# Patient Record
Sex: MALE | Race: WHITE | NOT HISPANIC OR LATINO | Employment: FULL TIME | URBAN - METROPOLITAN AREA
[De-identification: names, ages, dates, MRNs, and addresses within clinical notes are randomized per-mention and may not be internally consistent; named-entity substitution may affect disease eponyms.]

---

## 2019-01-22 ENCOUNTER — OFFICE VISIT (OUTPATIENT)
Dept: URGENT CARE | Age: 29
End: 2019-01-22
Payer: COMMERCIAL

## 2019-01-22 VITALS
HEART RATE: 92 BPM | SYSTOLIC BLOOD PRESSURE: 130 MMHG | OXYGEN SATURATION: 97 % | TEMPERATURE: 98.1 F | BODY MASS INDEX: 29.51 KG/M2 | WEIGHT: 188 LBS | HEIGHT: 67 IN | DIASTOLIC BLOOD PRESSURE: 70 MMHG | RESPIRATION RATE: 18 BRPM

## 2019-01-22 DIAGNOSIS — J11.1 INFLUENZA-LIKE ILLNESS: Primary | ICD-10-CM

## 2019-01-22 PROCEDURE — 99213 OFFICE O/P EST LOW 20 MIN: CPT | Performed by: FAMILY MEDICINE

## 2019-01-22 RX ORDER — SERTRALINE HYDROCHLORIDE 25 MG/1
50 TABLET, FILM COATED ORAL DAILY
COMMUNITY

## 2019-01-22 RX ORDER — OSELTAMIVIR PHOSPHATE 75 MG/1
75 CAPSULE ORAL EVERY 12 HOURS SCHEDULED
Qty: 10 CAPSULE | Refills: 0 | Status: SHIPPED | OUTPATIENT
Start: 2019-01-22 | End: 2019-01-27

## 2019-01-22 NOTE — PROGRESS NOTES
[address] [facility]        NAME: Srinivasan Snider is a 29 y o  male  : 1990    MRN: [id number]  DATE: 2019  TIME: 11:13 AM    Assessment and Plan   Influenza-like illness [R69]  1  Influenza-like illness  oseltamivir (TAMIFLU) 75 mg capsule         Patient Instructions     Patient Instructions   Rest, limit activity  Tamiflu every 12 hours for 10 doses  Cold medication as needed (try Flonase nasal spray 1 spray in each nostril once or twice a day)  Tylenol, or ibuprofen (Advil/Motrin), or try Aleve (please take with food as needed  Recheck/follow-up with family physician as needed  Elidia Barone  [phone]    Please go to the hospital emergency department if needed  Follow up with PCP in 3-5 days  Proceed to  ER if symptoms worsen  Chief Complaint     Chief Complaint   Patient presents with    Cold Like Symptoms     chest congestion , body aches  x 2 days, pain to chest ,neck, and mid back pain , denies vomiting and nausea, c/o 1 episode of  diarrhea     Headache         History of Present Illness       Congestion, body aches, neck and upper back pain (no injury) since yesterday (2019); patient is eating - no nausea or vomiting, 2 loose stools yesterday (2019)        Review of Systems   Review of Systems   HENT: Positive for congestion  Respiratory: Positive for cough  Cardiovascular: Negative  Musculoskeletal: Positive for back pain, myalgias and neck pain  Skin: Positive for pallor  Neurological: Negative            Current Medications       Current Outpatient Prescriptions:     sertraline (ZOLOFT) 25 mg tablet, Take 50 mg by mouth daily, Disp: , Rfl:     oseltamivir (TAMIFLU) 75 mg capsule, Take 1 capsule (75 mg total) by mouth every 12 (twelve) hours for 10 doses, Disp: 10 capsule, Rfl: 0    Current Allergies     Allergies as of 2019    (No Known Allergies)            The following portions of the patient's history were reviewed and updated as appropriate: allergies, current medications, past family history, past medical history, past social history, past surgical history and problem list      Past Medical History:   Diagnosis Date    Depression        History reviewed  No pertinent surgical history  No family history on file  Medications have been verified  Objective   /70   Pulse 92   Temp 98 1 °F (36 7 °C) (Temporal)   Resp 18   Ht 5' 7" (1 702 m)   Wt 85 3 kg (188 lb)   SpO2 97%   BMI 29 44 kg/m²        Physical Exam     Physical Exam   Constitutional: He is oriented to person, place, and time  He appears well-developed and well-nourished  Patient appears ill   HENT:   Right Ear: External ear normal    Left Ear: External ear normal    Nasal congestion with purulent mucus; injection, slight erythema of the oropharynx   Neck: Normal range of motion  Neck supple  Cardiovascular: Normal rate, regular rhythm and normal heart sounds  Pulmonary/Chest: Effort normal and breath sounds normal    Abdominal: Soft  He exhibits no distension  There is no tenderness  There is no rebound and no guarding  Musculoskeletal:   Tenderness and tightness of the cervical/upper thoracic paravertebral, and upper trapezius musculature   Neurological: He is alert and oriented to person, place, and time  No nuchal rigidity   Skin: Skin is warm  There is pallor  Fair turgor   Psychiatric: He has a normal mood and affect  His behavior is normal    Nursing note and vitals reviewed

## 2019-01-22 NOTE — PATIENT INSTRUCTIONS
Rest, limit activity  Tamiflu every 12 hours for 10 doses  Cold medication as needed (try Flonase nasal spray 1 spray in each nostril once or twice a day)  Tylenol, or ibuprofen (Advil/Motrin), or try Aleve (please take with food as needed  Recheck/follow-up with family physician as needed  Lelia Granados  [phone]    Please go to the hospital emergency department if needed

## 2020-11-23 ENCOUNTER — OFFICE VISIT (OUTPATIENT)
Dept: URGENT CARE | Age: 30
End: 2020-11-23
Payer: COMMERCIAL

## 2020-11-23 VITALS
TEMPERATURE: 98.6 F | HEART RATE: 58 BPM | BODY MASS INDEX: 28.88 KG/M2 | OXYGEN SATURATION: 100 % | HEIGHT: 67 IN | RESPIRATION RATE: 18 BRPM | WEIGHT: 184 LBS

## 2020-11-23 DIAGNOSIS — Z20.822 COVID-19 RULED OUT: Primary | ICD-10-CM

## 2020-11-23 PROCEDURE — 99213 OFFICE O/P EST LOW 20 MIN: CPT | Performed by: PHYSICIAN ASSISTANT

## 2020-11-23 PROCEDURE — U0003 INFECTIOUS AGENT DETECTION BY NUCLEIC ACID (DNA OR RNA); SEVERE ACUTE RESPIRATORY SYNDROME CORONAVIRUS 2 (SARS-COV-2) (CORONAVIRUS DISEASE [COVID-19]), AMPLIFIED PROBE TECHNIQUE, MAKING USE OF HIGH THROUGHPUT TECHNOLOGIES AS DESCRIBED BY CMS-2020-01-R: HCPCS | Performed by: PHYSICIAN ASSISTANT

## 2020-11-24 LAB — SARS-COV-2 RNA SPEC QL NAA+PROBE: NOT DETECTED

## 2021-04-08 ENCOUNTER — OFFICE VISIT (OUTPATIENT)
Dept: PHYSICAL THERAPY | Facility: REHABILITATION | Age: 31
End: 2021-04-08
Payer: COMMERCIAL

## 2021-04-08 ENCOUNTER — TRANSCRIBE ORDERS (OUTPATIENT)
Dept: PHYSICAL THERAPY | Facility: REHABILITATION | Age: 31
End: 2021-04-08

## 2021-04-08 DIAGNOSIS — M25.561 ACUTE PAIN OF RIGHT KNEE: Primary | ICD-10-CM

## 2021-04-08 DIAGNOSIS — M25.561 PAIN IN RIGHT KNEE: Primary | ICD-10-CM

## 2021-04-08 PROCEDURE — 97110 THERAPEUTIC EXERCISES: CPT | Performed by: PHYSICAL THERAPIST

## 2021-04-08 PROCEDURE — 97161 PT EVAL LOW COMPLEX 20 MIN: CPT | Performed by: PHYSICAL THERAPIST

## 2021-04-08 NOTE — PROGRESS NOTES
PT Evaluation     Today's date: 2021  Patient name: Marylee Stairs  : 1990  MRN: [id number]  Referring provider: No ref  provider found  Dx:   Encounter Diagnosis     ICD-10-CM    1  Acute pain of right knee  M25 561                   Assessment  Assessment details: Patient presents with decreased LE flexibility, decreased LE strength, and decreased function secondary to acute R knee pain  Patient would benefit from skilled PT intervention to address these issues and to maximize function  Thank you for the referral   Impairments: activity intolerance, impaired physical strength, lacks appropriate home exercise program and pain with function  Other impairment: impaired LE flexibility  Understanding of Dx/Px/POC: good   Prognosis: good    Goals  Short Term:  Pt will report decreased levels of pain by at least 2 subjective ratings in 4 weeks  Pt will demonstrate improved LE flexibility by 25% in 4 weeks  Pt will demonstrate improved strength by 1/2 grade MMT in 4 weeks  Long Term:   Pt will be independent in their HEP in 8 weeks  Pt will demonstrate improved FOTO, > predicted level  Pt will resume exercising at prior level of function without knee pain    Plan  Plan details: Patient was educated in [address] [place] [address] and Plan of Care  All questions were answered to pt's satisfaction  Patient would benefit from: skilled physical therapy  Planned therapy interventions: manual therapy, neuromuscular re-education, patient education, flexibility, therapeutic exercise, therapeutic activities, home exercise program and graded exercise  Frequency: 2x week  Duration in weeks: 6  Plan of Care beginning date: 2021  Plan of Care expiration date: 2021  Treatment plan discussed with: patient        Subjective Evaluation    History of Present Illness  Mechanism of injury: Pt is a 32 y o male with a c/o R knee pain for about 3 weeks ago which started 1 week after a long hike    Pt saw MD at [facility] and was told there is no structural damage  Pt unsure of what his diagnosis is (can't recall what the MD said)  Pt is now referred for PT at this time  Pt reports pain/diffiuclty with running, prolonged ambulation (sore after all day activity), squatting/lunging (has not attempted lately), prolonged driving  Pt is anxious to resume lifting weights and running  Pain  At best pain ratin  At worst pain ratin  Location: R knee      Diagnostic Tests  X-ray: normal  Patient Goals  Patient goals for therapy: decreased pain, return to sport/leisure activities, independence with ADLs/IADLs and increased strength          Objective     Observations     Additional Observation Details  No edema or ecchymosis noted  Tenderness     Right Knee   Tenderness in the medial joint line  Neurological Testing     Additional Neurological Details  Pt denies N/T    Active Range of Motion     Right Knee   Normal active range of motion    Passive Range of Motion     Right Knee   Flexion: WFL and with pain  Extension: [facility]    Additional Passive Range of Motion Details  Mild pain at end-range flexion    Mobility   Patellar Mobility:     Right Knee   WFL: medial, lateral, superior and inferior    Strength/Myotome Testing     Right Hip   Planes of Motion   Flexion: 4+ (mild knee pain)  Extension: 4+  Abduction: 4  External rotation: 4    Isolated Muscles   Gluteus maximums: 4+    Right Knee   Flexion: 5  Extension: 4+  Quadriceps contraction: good    Right Ankle/Foot   Dorsiflexion: 5  Plantar flexion: 5    Tests     Right Knee   Negative anterior drawer, anterior Lachman, Apley's compression, Apley's distraction, bounce home, lateral Joanne, medial Joanne, patella-femoral grind, posterior drawer, valgus stress test at 0 degrees, valgus stress test at 30 degrees, varus stress test at 0 degrees and varus stress test at 30 degrees  General Comments:      Knee Comments  Pt denies crepitus, instability, or catching/locking      Decreased flexibility HS, quads noted               Precautions: depression      Manuals 4/8            Possible GISTM To R distal medial quads             R prone quads stretch                                       Neuro Re-Ed             Rockerboard a/p, m/l (WS and balance)             Rebounder ball toss                                                                              Ther Ex             Bike             Bridges w/TB             Anabella Mejia SLR x4             Leg press                          Pt education + HEP instruction TP 8 mins                         Ther Activity             Squats             Lunges             Monster walk w/TB             Lat step downs             Gait Training                                       Modalities

## 2021-04-15 ENCOUNTER — OFFICE VISIT (OUTPATIENT)
Dept: PHYSICAL THERAPY | Facility: REHABILITATION | Age: 31
End: 2021-04-15
Payer: COMMERCIAL

## 2021-04-15 DIAGNOSIS — M25.561 ACUTE PAIN OF RIGHT KNEE: Primary | ICD-10-CM

## 2021-04-15 PROCEDURE — 97110 THERAPEUTIC EXERCISES: CPT | Performed by: PHYSICAL THERAPIST

## 2021-04-15 PROCEDURE — 97112 NEUROMUSCULAR REEDUCATION: CPT | Performed by: PHYSICAL THERAPIST

## 2021-04-15 PROCEDURE — 97140 MANUAL THERAPY 1/> REGIONS: CPT | Performed by: PHYSICAL THERAPIST

## 2021-04-15 NOTE — PROGRESS NOTES
Daily Note     Today's date: 4/15/2021  Patient name: Tammy Alcocer  : 1990  MRN: [id number]  Referring provider: No ref  provider found  Dx:   Encounter Diagnosis     ICD-10-CM    1  Acute pain of right knee  M25 561        Start Time: 1400  Stop Time: 1445  Total time in clinic (min): 45 minutes    Subjective: Patient reports that knee pain has been minimal over the past week  However he has not done much in terms of physical activity  Objective: See treatment diary below      Assessment: Patient tolerated first treatment well with no aggravation of sx reported following today's session  However, patient challenged with proximal hip PREs with compensation observed during standing hip kicks and clamshells  Progress, as tolerated  Plan: Continue per plan of care        Precautions: depression      Manuals 4/8 4/15           Possible GISTM To R distal medial quads  JAB           R prone quads stretch  JAB                                     Neuro Re-Ed             Rockerboard a/p, m/l (WS and balance)  WS 30"x2 ea           Rebounder ball toss 3 way  Green SLS 20x ea                                                                            Ther Ex             Upright Bike  L4 12'           Bridges w/PB  10x5" straight, bent           Clamshells  2x10 5" ea           [place] SLR x4  GTB 20x ea           Leg press  115# 2x15 DL SL                       Pt education + HEP instruction TP 8 mins                         Ther Activity             Squats             Lunges             Monster walk w/TB             Lat step downs             Gait Training                                       Modalities

## 2021-04-16 ENCOUNTER — OFFICE VISIT (OUTPATIENT)
Dept: PHYSICAL THERAPY | Facility: REHABILITATION | Age: 31
End: 2021-04-16
Payer: COMMERCIAL

## 2021-04-16 DIAGNOSIS — M25.561 ACUTE PAIN OF RIGHT KNEE: Primary | ICD-10-CM

## 2021-04-16 PROCEDURE — 97110 THERAPEUTIC EXERCISES: CPT | Performed by: PHYSICAL THERAPIST

## 2021-04-16 PROCEDURE — 97140 MANUAL THERAPY 1/> REGIONS: CPT | Performed by: PHYSICAL THERAPIST

## 2021-04-16 NOTE — PROGRESS NOTES
Daily Note     Today's date: 2021  Patient name: Frederick Bird  : 1990  MRN: [id number]  Referring provider: No ref  provider found  Dx:   Encounter Diagnosis     ICD-10-CM    1  Acute pain of right knee  M25 561         Pt arrived 20 min late, but was accommodated  Subjective: Pt denies any pain after last session  Objective: See treatment diary below      Assessment: Tolerated treatment well  No pain reported with TE performance  Patient would benefit from continued PT      Plan: Continue per plan of care  Progress treatment as tolerated         Precautions: depression      Manuals 4/8 4/15 4/16          Possible GISTM To R distal medial quads  JAB TP 5'          R prone quads stretch  JAB TP 3' w/half roll                                    Neuro Re-Ed             Rockerboard a/p, m/l (WS and balance)  WS 30"x2 ea           Rebounder ball toss 3 way  Green SLS 20x ea gmb x20ea 3 way                                                                           Ther Ex             Upright Bike  L4 12' nv          Bridges w/PB  10x5" straight, bent BTB 3" 2x10          Clamshells  2x10 5" ea btb 3" 2x10          [place] SLR x4  GTB 20x ea 5# x15ea b/l          Leg press  115# 2x15 DL # 2x15                       Pt education + HEP instruction TP 8 mins                         Ther Activity             Squats             Lunges   2x15ea          Monster walk w/TB             Lat step downs   6" 2x10          Gait Training                                       Modalities

## 2021-04-22 ENCOUNTER — OFFICE VISIT (OUTPATIENT)
Dept: PHYSICAL THERAPY | Facility: REHABILITATION | Age: 31
End: 2021-04-22
Payer: COMMERCIAL

## 2021-04-22 DIAGNOSIS — M25.561 ACUTE PAIN OF RIGHT KNEE: Primary | ICD-10-CM

## 2021-04-22 PROCEDURE — 97530 THERAPEUTIC ACTIVITIES: CPT | Performed by: PHYSICAL THERAPIST

## 2021-04-22 PROCEDURE — 97140 MANUAL THERAPY 1/> REGIONS: CPT | Performed by: PHYSICAL THERAPIST

## 2021-04-22 PROCEDURE — 97110 THERAPEUTIC EXERCISES: CPT | Performed by: PHYSICAL THERAPIST

## 2021-04-22 NOTE — PROGRESS NOTES
Daily Note     Today's date: 2021  Patient name: Matthew Chavez  : 1990  MRN: [id number]  Referring provider: No ref  provider found  Dx:   Encounter Diagnosis     ICD-10-CM    1  Acute pain of right knee  M25 561         1on1 [id number] and performed remaining TE's as part of IEP  Subjective: Pt reports some knee pain, rated 6/10, that started on Tuesday while at work  Pt notes doing a light leg workout at the gym on Monday without pain  4/10 pain/discomfort currently  Objective: See treatment diary below      Assessment: Tolerated treatment well  Mod myofacial restrictions distal medial quads region with GISTM this session  Pt notes intermittent mild pain/discomfort medial aspect of knee with TE performance  No increase in pain reported post session  Patient would benefit from continued PT      Plan: Continue per plan of care        Precautions: depression      Manuals 4/8 4/15 4/16 4/22         Possible GISTM To R distal medial quads  JAB TP 5' TP 5'         R prone quads stretch  JAB TP 3' w/half roll TP 3'w/half roll                                   Neuro Re-Ed             Rockerboard a/p, m/l (WS and balance)  WS 30"x2 ea  30x/30"ea         Rebounder ball toss 3 way  Green SLS 20x ea gmb x20ea 3 way gmb x20ea 3 way                                                                          Ther Ex             Upright Bike  L4 12' nv L4x10'         Bridges w/PB  10x5" straight, bent BTB 3" 2x10 btb 3" 2x15         Clamshells  2x10 5" ea btb 3" 2x10 btb 3" 2x10         [place] SLR x4  GTB 20x ea 5# x15ea b/l 5#x15ea b/l         Leg press  115# 2x15 DL # 2x15 # 2x15                      Pt education + HEP instruction TP 8 mins                         Ther Activity             Squats    2x10         Lunges   2x15ea 2x15ea         Monster walk w/TB    gtb 3 laps         Lat step downs   6" 2x10 6" 2x10         Gait Training                                       Modalities

## 2021-04-23 ENCOUNTER — OFFICE VISIT (OUTPATIENT)
Dept: PHYSICAL THERAPY | Facility: REHABILITATION | Age: 31
End: 2021-04-23
Payer: COMMERCIAL

## 2021-04-23 DIAGNOSIS — M25.561 ACUTE PAIN OF RIGHT KNEE: Primary | ICD-10-CM

## 2021-04-23 PROCEDURE — 97110 THERAPEUTIC EXERCISES: CPT | Performed by: PHYSICAL THERAPIST

## 2021-04-23 PROCEDURE — 97530 THERAPEUTIC ACTIVITIES: CPT | Performed by: PHYSICAL THERAPIST

## 2021-04-23 PROCEDURE — 97140 MANUAL THERAPY 1/> REGIONS: CPT | Performed by: PHYSICAL THERAPIST

## 2021-04-23 NOTE — PROGRESS NOTES
Daily Note     Today's date: 2021  Patient name: Geovanny Clay  : 1990  MRN: [id number]  Referring provider: No ref  provider found  Dx:   Encounter Diagnosis     ICD-10-CM    1  Acute pain of right knee  M25 561             1on1 [id number] and performed remaining TE's as part of IEP  Subjective: Pt reports improved pain today  Objective: See treatment diary below      Assessment: Tolerated treatment well  No significant pain complaints throughout session  Pt challenged with balance/proprioceptive activities, especially balancing m/l direction on rockerboard  Patient would benefit from continued PT      Plan: Continue per plan of care  Progress treatment as tolerated         Precautions: depression      Manuals 4/8 4/15 4/16 4/22 4/23        Possible GISTM To R distal medial quads  JAB TP 5' TP 5' TP 5'        R prone quads stretch  JAB TP 3' w/half roll TP 3'w/half roll TP 3' w/half roll                                  Neuro Re-Ed             Rockerboard a/p, m/l (WS and balance)  WS 30"x2 ea  30x/30"ea 30x/1'ea        Rebounder ball toss 3 way  Green SLS 20x ea gmb x20ea 3 way gmb x20ea 3 way gmb x20ea 3 way                                                                         Ther Ex             Upright Bike  L4 12' nv L4x10' L4x10'        Bridges w/PB  10x5" straight, bent BTB 3" 2x10 btb 3" 2x15 btb 3" 2x15        Clamshells  2x10 5" ea btb 3" 2x10 btb 3" 2x10 btb 3" 2x15        [place] SLR x4  GTB 20x ea 5# x15ea b/l 5#x15ea b/l 5#x20ea b/l        Leg press  115# 2x15 DL # 2x15 # 2x15 # 2x15                     Pt education + HEP instruction TP 8 mins                         Ther Activity             Squats    2x10 2x10        Lunges   2x15ea 2x15ea 2x15ea        Monster walk w/TB    gtb 3 laps gtb 3 laps        Lat step downs   6" 2x10 6" 2x10 6" 2x10        Gait Training                                       Modalities

## 2021-04-29 ENCOUNTER — OFFICE VISIT (OUTPATIENT)
Dept: PHYSICAL THERAPY | Facility: REHABILITATION | Age: 31
End: 2021-04-29
Payer: COMMERCIAL

## 2021-04-29 DIAGNOSIS — M25.561 ACUTE PAIN OF RIGHT KNEE: Primary | ICD-10-CM

## 2021-04-29 PROCEDURE — 97530 THERAPEUTIC ACTIVITIES: CPT | Performed by: PHYSICAL THERAPIST

## 2021-04-29 PROCEDURE — 97110 THERAPEUTIC EXERCISES: CPT | Performed by: PHYSICAL THERAPIST

## 2021-04-29 PROCEDURE — 97140 MANUAL THERAPY 1/> REGIONS: CPT | Performed by: PHYSICAL THERAPIST

## 2021-04-29 NOTE — PROGRESS NOTES
Daily Note     Today's date: 2021  Patient name: Tammy Alcocer  : 1990  MRN: [id number]  Referring provider: No ref  provider found  Dx:   Encounter Diagnosis     ICD-10-CM    1  Acute pain of right knee  M25 561                   Subjective: Pt reports overall the knee has been feeling well without pain  Pt noted mild pain while at work yesterday, but nothing like it was  Objective: See treatment diary below      Assessment: Tolerated treatment well  Good tolerance to progressions this session with no significant pain  Patient exhibited good technique with therapeutic exercises      Plan: Continue per plan of care  Progress treatment as tolerated         Precautions: depression      Manuals 4/8 4/15 4/16 4/22 4/23 4/29       Possible GISTM To R distal medial quads  JAB TP 5' TP 5' TP 5' TP 5'       R prone quads stretch  JAB TP 3' w/half roll TP 3'w/half roll TP 3' w/half roll TP 3' w/half roll                                 Neuro Re-Ed             Rockerboard a/p, m/l (WS and balance)  WS 30"x2 ea  30x/30"ea 30x/1'ea 30x/1'ea       Rebounder ball toss 3 way  Green SLS 20x ea gmb x20ea 3 way gmb x20ea 3 way gmb x20ea 3 way rmb x20ea 3 way                                                                        Ther Ex             Upright Bike  L4 12' nv L4x10' L4x10' St 8 L4x10'       Bridges w/PB  10x5" straight, bent BTB 3" 2x10 btb 3" 2x15 btb 3" 2x15 3" 2x15 plum TB       Clamshells  2x10 5" ea btb 3" 2x10 btb 3" 2x10 btb 3" 2x15 3" 2x15 plum TB       [place] SLR x4  GTB 20x ea 5# x15ea b/l 5#x15ea b/l 5#x20ea b/l 8#x20 e ab/l       Leg press  115# 2x15 DL # 2x15 # 2x15 # 2x15 # 2x15                    Pt education + HEP instruction TP 8 mins                         Ther Activity             Squats    2x10 2x10 15#kb 2x10       Lunges   2x15ea 2x15ea 2x15ea 2x10ea BOSU       Monster walk w/TB    gtb 3 laps gtb 3 laps gtb 3 laps       Lat step downs   6" 2x10 6" 2x10 6" 2x10 6" 2x10       Gait Training                                       Modalities

## 2021-04-30 ENCOUNTER — OFFICE VISIT (OUTPATIENT)
Dept: PHYSICAL THERAPY | Facility: REHABILITATION | Age: 31
End: 2021-04-30
Payer: COMMERCIAL

## 2021-04-30 DIAGNOSIS — M25.561 ACUTE PAIN OF RIGHT KNEE: Primary | ICD-10-CM

## 2021-04-30 PROCEDURE — 97530 THERAPEUTIC ACTIVITIES: CPT | Performed by: PHYSICAL THERAPIST

## 2021-04-30 PROCEDURE — 97110 THERAPEUTIC EXERCISES: CPT | Performed by: PHYSICAL THERAPIST

## 2021-04-30 PROCEDURE — 97140 MANUAL THERAPY 1/> REGIONS: CPT | Performed by: PHYSICAL THERAPIST

## 2021-04-30 NOTE — PROGRESS NOTES
Daily Note     Today's date: 2021  Patient name: Sergio Draper  : 1990  MRN: [id number]  Referring provider: No ref  provider found  Dx:   Encounter Diagnosis     ICD-10-CM    1  Acute pain of right knee  M25 561            1on1 [id number] and performed remainder of TE's as part of IEP  Subjective: Pt offers no complaints of pain  Objective: See treatment diary below      Assessment: Tolerated treatment well  Patient exhibited good technique with therapeutic exercises  Pt was pain-free with exercises this session  Plan: Continue per plan of care  Pt to attempt some running on TM this weekend    Probable d/c to ongoing HEP next week as long as symptoms remain minimal        Precautions: depression      Manuals 4/8 4/15 4/16 4/22 4/23 4/29 4/30      Possible GISTM To R distal medial quads  JAB TP 5' TP 5' TP 5' TP 5' TP 5'      R prone quads stretch  JAB TP 3' w/half roll TP 3'w/half roll TP 3' w/half roll TP 3' w/half roll TP 3' w/half roll                                Neuro Re-Ed             Rockerboard a/p, m/l (WS and balance)  WS 30"x2 ea  30x/30"ea 30x/1'ea 30x/1'ea 30x/1'ea      Rebounder ball toss 3 way  Green SLS 20x ea gmb x20ea 3 way gmb x20ea 3 way gmb x20ea 3 way rmb x20ea 3 way rmb x20ea 3 way                                                                       Ther Ex             Upright Bike  L4 12' nv L4x10' L4x10' St 8 L4x10' ST 8 L4x10'      Bridges w/PB  10x5" straight, bent BTB 3" 2x10 btb 3" 2x15 btb 3" 2x15 3" 2x15 plum TB 3" 2x15 plum TB      Clamshells  2x10 5" ea btb 3" 2x10 btb 3" 2x10 btb 3" 2x15 3" 2x15 plum TB 3" 2x15 plum TB      [place] SLR x4  GTB 20x ea 5# x15ea b/l 5#x15ea b/l 5#x20ea b/l 8#x20 e ab/l 8#x20 ea b/l      Leg press  115# 2x15 DL # 2x15 # 2x15 # 2x15 # 2x15 # 2x15                   Pt education + HEP instruction TP 8 mins                         Ther Activity             Squats    2x10 2x10 15#kb 2x10 15#kb 2x15      Lunges   2x15ea 2x15ea 2x15ea 2x10ea BOSU BOSU 2x15ea      Monster walk w/TB    gtb 3 laps gtb 3 laps gtb 3 laps gtb 3 laps      Lat step downs   6" 2x10 6" 2x10 6" 2x10 6" 2x10 6" 2x15      Gait Training                                       Modalities

## 2021-05-05 ENCOUNTER — TRANSCRIBE ORDERS (OUTPATIENT)
Dept: PHYSICAL THERAPY | Facility: REHABILITATION | Age: 31
End: 2021-05-05

## 2021-05-05 DIAGNOSIS — M25.561 PAIN IN RIGHT KNEE: Primary | ICD-10-CM

## 2021-05-21 NOTE — PROGRESS NOTES
Pt was progressing well with plans to be d/c, but did not attend his final appointment  Pt will be d/c at this time

## 2022-08-25 ENCOUNTER — TELEPHONE (OUTPATIENT)
Dept: OTHER | Facility: OTHER | Age: 32
End: 2022-08-25

## 2022-08-25 NOTE — TELEPHONE ENCOUNTER
Patient calling in stating that he would like to make an appt and establish care at [facility]   Appt made for patient 9/9/22 at 9:00am

## 2022-09-01 NOTE — LETTER
May 5, 2021    Lenin Bond PA-C  [address]  [address] [place] [address]    Patient: Geovanny Clay   YOB: 1990   Date of Visit: 2021     Encounter Diagnosis     ICD-10-CM    1  Acute pain of right knee  M25 561        Dear Dr Geovanny Clay: Thank you for your recent referral of Geovanny Clay  Please review the attached evaluation summary from Navdeep's recent visit  Please verify that you agree with the plan of care by signing the attached order  If you have any questions or concerns, please do not hesitate to call  I sincerely appreciate the opportunity to share in the care of one of your patients and hope to have another opportunity to work with you in the near future  Sincerely,    Red Kussmaul, PT      Referring Provider:      I certify that I have read the below Plan of Care and certify the need for these services furnished under this plan of treatment while under my care  Lenin Bond PA-C  [address]  [address] [place] [address]  Via Fax: [phone]          PT Evaluation     Today's date: 2021  Patient name: Geovanny Clay  : 1990  MRN: [id number]  Referring provider: No ref  provider found  Dx:   Encounter Diagnosis     ICD-10-CM    1  Acute pain of right knee  M25 561                   Assessment  Assessment details: Patient presents with decreased LE flexibility, decreased LE strength, and decreased function secondary to acute R knee pain  Patient would benefit from skilled PT intervention to address these issues and to maximize function    Thank you for the referral   Impairments: activity intolerance, impaired physical strength, lacks appropriate home exercise program and pain with function  Other impairment: impaired LE flexibility  Understanding of Dx/Px/POC: good   Prognosis: good    Goals  Short Term:  Pt will report decreased levels of pain by at least 2 subjective ratings in 4 weeks  Pt will demonstrate Recommended OBSERVATION and MONITORING for change. improved LE flexibility by 25% in 4 weeks  Pt will demonstrate improved strength by 1/2 grade MMT in 4 weeks  Long Term:   Pt will be independent in their HEP in 8 weeks  Pt will demonstrate improved FOTO, > predicted level  Pt will resume exercising at prior level of function without knee pain    Plan  Plan details: Patient was educated in [address] [place] [address] and Plan of Care  All questions were answered to pt's satisfaction  Patient would benefit from: skilled physical therapy  Planned therapy interventions: manual therapy, neuromuscular re-education, patient education, flexibility, therapeutic exercise, therapeutic activities, home exercise program and graded exercise  Frequency: 2x week  Duration in weeks: 6  Plan of Care beginning date: 2021  Plan of Care expiration date: 2021  Treatment plan discussed with: patient        Subjective Evaluation    History of Present Illness  Mechanism of injury: Pt is a 32 y o male with a c/o R knee pain for about 3 weeks ago which started 1 week after a long hike  Pt [facility] and was told there is no structural damage  Pt unsure of what his diagnosis is (can't recall what the MD said)  Pt is now referred for PT at this time  Pt reports pain/diffiuclty with running, prolonged ambulation (sore after all day activity), squatting/lunging (has not attempted lately), prolonged driving  Pt is anxious to resume lifting weights and running  Pain  At best pain ratin  At worst pain ratin  Location: R knee      Diagnostic Tests  X-ray: normal  Patient Goals  Patient goals for therapy: decreased pain, return to sport/leisure activities, independence with ADLs/IADLs and increased strength          Objective     Observations     Additional Observation Details  No edema or ecchymosis noted  Tenderness     Right Knee   Tenderness in the medial joint line       Neurological Testing     Additional Neurological Details  Pt denies N/T    Active Range of Motion     Right Knee Normal active range of motion    Passive Range of Motion     Right Knee   Flexion: WFL and with pain  Extension: [facility]    Additional Passive Range of Motion Details  Mild pain at end-range flexion    Mobility   Patellar Mobility:     Right Knee   WFL: medial, lateral, superior and inferior    Strength/Myotome Testing     Right Hip   Planes of Motion   Flexion: 4+ (mild knee pain)  Extension: 4+  Abduction: 4  External rotation: 4    Isolated Muscles   Gluteus maximums: 4+    Right Knee   Flexion: 5  Extension: 4+  Quadriceps contraction: good    Right Ankle/Foot   Dorsiflexion: 5  Plantar flexion: 5    Tests     Right Knee   Negative anterior drawer, anterior Lachman, Apley's compression, Apley's distraction, bounce home, lateral Joanne, medial Joanne, patella-femoral grind, posterior drawer, valgus stress test at 0 degrees, valgus stress test at 30 degrees, varus stress test at 0 degrees and varus stress test at 30 degrees  General Comments:      Knee Comments  Pt denies crepitus, instability, or catching/locking  Decreased flexibility HS, quads noted               Precautions: depression      Manuals 4/8            Possible GISTM To R distal medial quads             R prone quads stretch                                       Neuro Re-Ed             Rockerboard a/p, m/l (WS and balance)             Rebounder ball toss                                                                              Ther Ex             Bike             Bridges w/TB             Anabella Mejia SLR x4             Leg press                          Pt education + HEP instruction TP 8 mins                         Ther Activity             Squats             Lunges             Monster walk w/TB             Lat step downs             Gait Training                                       Modalities

## 2022-09-09 ENCOUNTER — OFFICE VISIT (OUTPATIENT)
Dept: INTERNAL MEDICINE CLINIC | Facility: CLINIC | Age: 32
End: 2022-09-09
Payer: COMMERCIAL

## 2022-09-09 VITALS
DIASTOLIC BLOOD PRESSURE: 80 MMHG | BODY MASS INDEX: 31.39 KG/M2 | TEMPERATURE: 98 F | OXYGEN SATURATION: 97 % | HEIGHT: 67 IN | HEART RATE: 62 BPM | WEIGHT: 200 LBS | SYSTOLIC BLOOD PRESSURE: 120 MMHG

## 2022-09-09 DIAGNOSIS — Z11.59 ENCOUNTER FOR HEPATITIS C SCREENING TEST FOR LOW RISK PATIENT: ICD-10-CM

## 2022-09-09 DIAGNOSIS — Z13.1 SCREENING FOR DIABETES MELLITUS: Primary | ICD-10-CM

## 2022-09-09 DIAGNOSIS — Z13.220 SCREENING FOR HYPERLIPIDEMIA: ICD-10-CM

## 2022-09-09 DIAGNOSIS — Z11.4 SCREENING FOR HIV WITHOUT PRESENCE OF RISK FACTORS: ICD-10-CM

## 2022-09-09 DIAGNOSIS — Z23 ENCOUNTER FOR IMMUNIZATION: Primary | ICD-10-CM

## 2022-09-09 DIAGNOSIS — Z13.0 SCREENING FOR DEFICIENCY ANEMIA: ICD-10-CM

## 2022-09-09 DIAGNOSIS — Z13.1 SCREENING FOR DIABETES MELLITUS: ICD-10-CM

## 2022-09-09 DIAGNOSIS — Z13.0 SCREENING FOR DEFICIENCY ANEMIA: Primary | ICD-10-CM

## 2022-09-09 PROCEDURE — 99203 OFFICE O/P NEW LOW 30 MIN: CPT | Performed by: INTERNAL MEDICINE

## 2022-09-09 PROCEDURE — 90471 IMMUNIZATION ADMIN: CPT | Performed by: INTERNAL MEDICINE

## 2022-09-09 PROCEDURE — 90715 TDAP VACCINE 7 YRS/> IM: CPT | Performed by: INTERNAL MEDICINE

## 2022-09-09 NOTE — PROGRESS NOTES
Assessment and Plan:     1  Screening for diabetes mellitus  -     Comprehensive metabolic panel  -     Urinalysis with microscopic  -     Hemoglobin A1C    2  Screening for hyperlipidemia  -     Lipid panel; Future    3  Screening for deficiency anemia  -     CBC and differential    4  Screening for HIV without presence of risk factors  -     HIV 1/2 Antigen/Antibody (4th Generation) w Reflex [facility]; Future    5  Encounter for hepatitis C screening test for low risk patient  -     Hepatitis C antibody; Future         Subjective:     Patient ID: Ibis Mai is a 28 y o  male  [facility] is a new patient coming to establish care  He is also new patient for the network  He admits being healthy  His last doctor's visit was about 3-4 years ago  Has depression in his history that tried to treat with Zoloft for over a year but due to little improvement decided to stop it  Currently admits not being depressed and reports enjoying doing his hobbies  He is single and is working at a [employer] as a  and admits a moderate stressful job  He is a former smoker, quit smoking about a year a go  Drinks alcohol socially and denies any illicit drugs  [facility] reports attending gym 3-4 times a week although admits a not balanced diet  He was recommended to incorporated more fruits and veggies and decrease the processed products  [facility] would mention that at times he would have trouble falling asleep and that melatonin would help  We talked about sleep hygiene and was recommended to continue taking melatonin as needed  [facility] also asked if he can get the Tdap vaccine today since his sister is pregnant  He received the vaccine and was provided with the information about the reaction post vaccine  [facility] was provided with lab work to be done at any time and he will be seen at the next visit in a year  Patient has no other complaints at this time      Review of Systems   Constitutional: Negative for chills and fever    HENT: Negative for ear pain and sore throat  Eyes: Negative for pain and visual disturbance  Respiratory: Negative for cough and shortness of breath  Cardiovascular: Negative for chest pain and palpitations  Gastrointestinal: Negative for abdominal pain, diarrhea, nausea and vomiting  Endocrine: Negative for polydipsia and polyphagia  Genitourinary: Negative for difficulty urinating, dysuria and hematuria  Musculoskeletal: Negative for arthralgias and back pain  Skin: Negative for color change and rash  Neurological: Negative for seizures and syncope  Psychiatric/Behavioral: Positive for sleep disturbance  Negative for dysphoric mood  All other systems reviewed and are negative  Patient Active Problem List   Diagnosis    Screening for deficiency anemia    Screening for hyperlipidemia    Screening for HIV without presence of risk factors    Encounter for hepatitis C screening test for low risk patient    Screening for diabetes mellitus        No current outpatient medications on file  No current facility-administered medications for this visit  No Known Allergies     The following portions of the patient's history were reviewed and updated as appropriate: allergies, current medications, past family history, past medical history, past social history, past surgical history and problem list           Objective:    /80   Pulse 62   Temp 98 °F (36 7 °C)   Ht 5' 7" (1 702 m)   Wt 90 7 kg (200 lb)   SpO2 97%   BMI 31 32 kg/m²      Body mass index is 31 32 kg/m²  Physical Exam  Vitals and nursing note reviewed  Constitutional:       Appearance: Normal appearance  He is well-developed  He is not ill-appearing  HENT:      Head: Normocephalic and atraumatic  Nose: Nose normal    Eyes:      Conjunctiva/sclera: Conjunctivae normal    Cardiovascular:      Rate and Rhythm: Normal rate and regular rhythm  Pulses: Normal pulses        Heart sounds: Normal heart sounds  No murmur heard  Pulmonary:      Effort: Pulmonary effort is normal  No respiratory distress  Breath sounds: Normal breath sounds  Abdominal:      General: Bowel sounds are normal       Palpations: Abdomen is soft  Tenderness: There is no abdominal tenderness  Musculoskeletal:      Cervical back: Neck supple  Right lower leg: No edema  Left lower leg: No edema  Skin:     General: Skin is warm and dry  Neurological:      General: No focal deficit present  Mental Status: He is alert and oriented to person, place, and time  Mental status is at baseline  Psychiatric:         Mood and Affect: Mood normal          Behavior: Behavior normal          Thought Content:  Thought content normal          Judgment: Judgment normal         Nutrition Assessment and Intervention:     Reviewed food recall journal      Physical Activity Assessment and Intervention:    Activity journal reviewed      Emotional and Mental Well-being, Sleep, Connectedness Assessment and Intervention:    Sleep/stress assessment performed    Depression and anxiety screening performed and reviewed      Tobacco and Toxic Substance Assessment and Intervention:     Tobacco use screening performed    Alcohol and drug use screening performed    Brief intervention performed for tobacco, alcohol, or drug use      Therapeutic Lifestyle Change Visit:     One-on-one comprehensive counseling, coaching, and health behavior change visit completed

## 2022-10-05 ENCOUNTER — APPOINTMENT (OUTPATIENT)
Dept: LAB | Facility: MEDICAL CENTER | Age: 32
End: 2022-10-05
Payer: COMMERCIAL

## 2022-10-05 DIAGNOSIS — Z11.4 SCREENING FOR HIV WITHOUT PRESENCE OF RISK FACTORS: ICD-10-CM

## 2022-10-05 DIAGNOSIS — Z13.220 SCREENING FOR HYPERLIPIDEMIA: ICD-10-CM

## 2022-10-05 DIAGNOSIS — Z11.59 ENCOUNTER FOR HEPATITIS C SCREENING TEST FOR LOW RISK PATIENT: ICD-10-CM

## 2022-10-05 LAB
ALBUMIN SERPL BCP-MCNC: 4 G/DL (ref 3.5–5)
ALP SERPL-CCNC: 72 U/L (ref 46–116)
ALT SERPL W P-5'-P-CCNC: 52 U/L (ref 12–78)
ANION GAP SERPL CALCULATED.3IONS-SCNC: 5 MMOL/L (ref 4–13)
AST SERPL W P-5'-P-CCNC: 23 U/L (ref 5–45)
BACTERIA UR QL AUTO: ABNORMAL /HPF
BASOPHILS # BLD AUTO: 0.03 THOUSANDS/ΜL (ref 0–0.1)
BASOPHILS NFR BLD AUTO: 1 % (ref 0–1)
BILIRUB SERPL-MCNC: 0.91 MG/DL (ref 0.2–1)
BILIRUB UR QL STRIP: NEGATIVE
BUN SERPL-MCNC: 19 MG/DL (ref 5–25)
CALCIUM SERPL-MCNC: 9.1 MG/DL (ref 8.3–10.1)
CHLORIDE SERPL-SCNC: 107 MMOL/L (ref 96–108)
CHOLEST SERPL-MCNC: 210 MG/DL
CLARITY UR: CLEAR
CO2 SERPL-SCNC: 26 MMOL/L (ref 21–32)
COLOR UR: ABNORMAL
CREAT SERPL-MCNC: 1.04 MG/DL (ref 0.6–1.3)
EOSINOPHIL # BLD AUTO: 0.1 THOUSAND/ΜL (ref 0–0.61)
EOSINOPHIL NFR BLD AUTO: 2 % (ref 0–6)
ERYTHROCYTE [DISTWIDTH] IN BLOOD BY AUTOMATED COUNT: 12.1 % (ref 11.6–15.1)
EST. AVERAGE GLUCOSE BLD GHB EST-MCNC: 108 MG/DL
GFR SERPL CREATININE-BSD FRML MDRD: 94 ML/MIN/1.73SQ M
GLUCOSE P FAST SERPL-MCNC: 96 MG/DL (ref 65–99)
GLUCOSE UR STRIP-MCNC: NEGATIVE MG/DL
HBA1C MFR BLD: 5.4 %
HCT VFR BLD AUTO: 46.6 % (ref 36.5–49.3)
HCV AB SER QL: NORMAL
HDLC SERPL-MCNC: 60 MG/DL
HGB BLD-MCNC: 15.4 G/DL (ref 12–17)
HGB UR QL STRIP.AUTO: NEGATIVE
IMM GRANULOCYTES # BLD AUTO: 0.01 THOUSAND/UL (ref 0–0.2)
IMM GRANULOCYTES NFR BLD AUTO: 0 % (ref 0–2)
KETONES UR STRIP-MCNC: NEGATIVE MG/DL
LDLC SERPL CALC-MCNC: 140 MG/DL (ref 0–100)
LEUKOCYTE ESTERASE UR QL STRIP: NEGATIVE
LYMPHOCYTES # BLD AUTO: 1.41 THOUSANDS/ΜL (ref 0.6–4.47)
LYMPHOCYTES NFR BLD AUTO: 32 % (ref 14–44)
MCH RBC QN AUTO: 30.3 PG (ref 26.8–34.3)
MCHC RBC AUTO-ENTMCNC: 33 G/DL (ref 31.4–37.4)
MCV RBC AUTO: 92 FL (ref 82–98)
MONOCYTES # BLD AUTO: 0.37 THOUSAND/ΜL (ref 0.17–1.22)
MONOCYTES NFR BLD AUTO: 8 % (ref 4–12)
MUCOUS THREADS UR QL AUTO: ABNORMAL
NEUTROPHILS # BLD AUTO: 2.48 THOUSANDS/ΜL (ref 1.85–7.62)
NEUTS SEG NFR BLD AUTO: 57 % (ref 43–75)
NITRITE UR QL STRIP: NEGATIVE
NON-SQ EPI CELLS URNS QL MICRO: ABNORMAL /HPF
NONHDLC SERPL-MCNC: 150 MG/DL
NRBC BLD AUTO-RTO: 0 /100 WBCS
PH UR STRIP.AUTO: 7 [PH]
PLATELET # BLD AUTO: 230 THOUSANDS/UL (ref 149–390)
PMV BLD AUTO: 11.2 FL (ref 8.9–12.7)
POTASSIUM SERPL-SCNC: 4 MMOL/L (ref 3.5–5.3)
PROT SERPL-MCNC: 7.2 G/DL (ref 6.4–8.4)
PROT UR STRIP-MCNC: ABNORMAL MG/DL
RBC # BLD AUTO: 5.09 MILLION/UL (ref 3.88–5.62)
RBC #/AREA URNS AUTO: ABNORMAL /HPF
SODIUM SERPL-SCNC: 138 MMOL/L (ref 135–147)
SP GR UR STRIP.AUTO: 1.03 (ref 1–1.03)
TRIGL SERPL-MCNC: 48 MG/DL
UROBILINOGEN UR STRIP-ACNC: <2 MG/DL
WBC # BLD AUTO: 4.4 THOUSAND/UL (ref 4.31–10.16)
WBC #/AREA URNS AUTO: ABNORMAL /HPF

## 2022-10-05 PROCEDURE — 80061 LIPID PANEL: CPT

## 2022-10-05 PROCEDURE — 80053 COMPREHEN METABOLIC PANEL: CPT | Performed by: INTERNAL MEDICINE

## 2022-10-05 PROCEDURE — 86803 HEPATITIS C AB TEST: CPT

## 2022-10-05 PROCEDURE — 83036 HEMOGLOBIN GLYCOSYLATED A1C: CPT | Performed by: INTERNAL MEDICINE

## 2022-10-05 PROCEDURE — 36415 COLL VENOUS BLD VENIPUNCTURE: CPT | Performed by: INTERNAL MEDICINE

## 2022-10-05 PROCEDURE — 85025 COMPLETE CBC W/AUTO DIFF WBC: CPT | Performed by: INTERNAL MEDICINE

## 2022-10-05 PROCEDURE — 81001 URINALYSIS AUTO W/SCOPE: CPT | Performed by: INTERNAL MEDICINE

## 2022-10-05 PROCEDURE — 87389 HIV-1 AG W/HIV-1&-2 AB AG IA: CPT

## 2022-10-05 NOTE — RESULT ENCOUNTER NOTE
Suresh Roach,  Your labs are optimal  Due to your high HDL cholesterol of 60 (the higher the better), your LDL and total cholesterol are not significant     Dr Hart Pair

## 2022-10-06 LAB — HIV 1+2 AB+HIV1 P24 AG SERPL QL IA: NORMAL

## 2022-11-08 PROBLEM — Z11.4 SCREENING FOR HIV WITHOUT PRESENCE OF RISK FACTORS: Status: RESOLVED | Noted: 2022-09-09 | Resolved: 2022-11-08

## 2022-11-08 PROBLEM — Z13.220 SCREENING FOR HYPERLIPIDEMIA: Status: RESOLVED | Noted: 2022-09-09 | Resolved: 2022-11-08

## 2022-11-08 PROBLEM — Z13.0 SCREENING FOR DEFICIENCY ANEMIA: Status: RESOLVED | Noted: 2022-09-09 | Resolved: 2022-11-08

## 2022-11-08 PROBLEM — Z13.1 SCREENING FOR DIABETES MELLITUS: Status: RESOLVED | Noted: 2022-09-09 | Resolved: 2022-11-08

## 2023-03-13 ENCOUNTER — OFFICE VISIT (OUTPATIENT)
Dept: URGENT CARE | Age: 33
End: 2023-03-13

## 2023-03-13 VITALS
SYSTOLIC BLOOD PRESSURE: 141 MMHG | HEART RATE: 77 BPM | TEMPERATURE: 96.6 F | OXYGEN SATURATION: 98 % | RESPIRATION RATE: 16 BRPM | DIASTOLIC BLOOD PRESSURE: 101 MMHG

## 2023-03-13 DIAGNOSIS — J06.9 ACUTE URI: Primary | ICD-10-CM

## 2023-03-13 LAB
SARS-COV-2 AG UPPER RESP QL IA: NEGATIVE
VALID CONTROL: NORMAL

## 2023-03-13 NOTE — PROGRESS NOTES
[facility]        NAME: Mignon Mcdonald is a 28 y o  male  : 1990    MRN: [id number]  DATE: 2023  TIME: 10:46 AM    Assessment and Plan   Acute URI [J06 9]  1  Acute URI  Poct Covid 19 Rapid Antigen Test            Patient Instructions     Please continue with over-the-counter cough and cold medication as needed for your symptoms  You may alternate ibuprofen and Tylenol as needed for fever  Follow up with PCP in 3-5 days  Proceed to  ER if symptoms worsen  Chief Complaint     Chief Complaint   Patient presents with   • Cough     For 2 days, nasal congestion, fever, exposed to covid positive girlfriend,          History of Present Illness       Patient presenting for evaluation of upper respiratory symptoms that began 3 days ago  Patient states that over the past 3 days he has been having intermittent fevers, Tmax 101, runny nose, congestion and diarrhea  Patient states that he has been taking Tylenol and DayQuil with mild relief of his symptoms  He denies any chest pain or shortness of breath at this time  Patient states that his girlfriend tested positive for COVID 1 day ago  Patient is vaccinated against COVID  Cough  Associated symptoms include a fever and rhinorrhea  Pertinent negatives include no chest pain or shortness of breath  Review of Systems   Review of Systems   Constitutional: Positive for fever  HENT: Positive for congestion and rhinorrhea  Respiratory: Negative for cough and shortness of breath  Cardiovascular: Negative for chest pain  Gastrointestinal: Positive for diarrhea  Negative for nausea and vomiting  All other systems reviewed and are negative  Current Medications     No current outpatient medications on file      Current Allergies     Allergies as of 2023   • (No Known Allergies)            The following portions of the patient's history were reviewed and updated as appropriate: allergies, current medications, past family history, past medical history, past social history, past surgical history and problem list      Past Medical History:   Diagnosis Date   • Depression        History reviewed  No pertinent surgical history  History reviewed  No pertinent family history  Medications have been verified  Objective   BP (!) 141/101   Pulse 77   Temp (!) 96 6 °F (35 9 °C)   Resp 16   SpO2 98%        Physical Exam     Physical Exam  Vitals and nursing note reviewed  Constitutional:       General: He is not in acute distress  Appearance: Normal appearance  He is normal weight  He is not ill-appearing, toxic-appearing or diaphoretic  HENT:      Head: Normocephalic and atraumatic  Right Ear: Tympanic membrane normal       Left Ear: Tympanic membrane normal       Nose: Nose normal  No congestion or rhinorrhea  Mouth/Throat:      Mouth: Mucous membranes are moist       Pharynx: Oropharynx is clear  No oropharyngeal exudate or posterior oropharyngeal erythema  Eyes:      General:         Right eye: No discharge  Left eye: No discharge  Cardiovascular:      Rate and Rhythm: Normal rate and regular rhythm  Pulses: Normal pulses  Heart sounds: Normal heart sounds  No murmur heard  No friction rub  No gallop  Pulmonary:      Effort: Pulmonary effort is normal  No respiratory distress  Breath sounds: Normal breath sounds  No stridor  No wheezing, rhonchi or rales  Chest:      Chest wall: No tenderness  Abdominal:      General: Bowel sounds are normal       Palpations: Abdomen is soft  Tenderness: There is no abdominal tenderness  Skin:     General: Skin is warm and dry  Neurological:      Mental Status: He is alert     Psychiatric:         Mood and Affect: Mood normal          Behavior: Behavior normal

## 2023-03-13 NOTE — PATIENT INSTRUCTIONS
Please continue with over-the-counter cough and cold medication as needed for your symptoms  You may alternate ibuprofen and Tylenol as needed for fever

## 2023-03-13 NOTE — LETTER
March 13, 2023     Patient: Thierry Cisneros   YOB: 1990   Date of Visit: 3/13/2023       To Whom it May Concern:    Fernanda Mcguire was seen in my clinic on 3/13/2023  He may return to work on 3/14/2023 as long he is fever free for 24 hours without fever reducing medication  His COVID test in office was negative  If you have any questions or concerns, please don't hesitate to call           Sincerely,          DRE Stewart        CC: No Recipients

## 2023-08-06 PROBLEM — Z11.59 ENCOUNTER FOR HEPATITIS C SCREENING TEST FOR LOW RISK PATIENT: Status: RESOLVED | Noted: 2022-09-09 | Resolved: 2023-08-06

## 2023-08-07 ENCOUNTER — OFFICE VISIT (OUTPATIENT)
Dept: INTERNAL MEDICINE CLINIC | Facility: CLINIC | Age: 33
End: 2023-08-07
Payer: COMMERCIAL

## 2023-08-07 VITALS
SYSTOLIC BLOOD PRESSURE: 110 MMHG | OXYGEN SATURATION: 96 % | DIASTOLIC BLOOD PRESSURE: 76 MMHG | TEMPERATURE: 97 F | WEIGHT: 216 LBS | BODY MASS INDEX: 33.9 KG/M2 | HEIGHT: 67 IN | HEART RATE: 71 BPM

## 2023-08-07 DIAGNOSIS — K21.00 GASTROESOPHAGEAL REFLUX DISEASE WITH ESOPHAGITIS WITHOUT HEMORRHAGE: Primary | ICD-10-CM

## 2023-08-07 PROCEDURE — 99213 OFFICE O/P EST LOW 20 MIN: CPT | Performed by: INTERNAL MEDICINE

## 2023-08-07 RX ORDER — OMEPRAZOLE 20 MG/1
20 TABLET, DELAYED RELEASE ORAL DAILY
Qty: 30 TABLET | Refills: 0 | Status: SHIPPED | OUTPATIENT
Start: 2023-08-07

## 2023-08-07 NOTE — PROGRESS NOTES
INTERNAL MEDICINE OFFICE VISIT       NAME: Rhonda Kaba  AGE: 35 y.o. SEX: male       : 1990        MRN: [id number]    DATE: 2023  TIME: 8:09 AM    Assessment and Plan   1. Gastroesophageal reflux disease with esophagitis without hemorrhage  -     omeprazole (PriLOSEC OTC) 20 MG tablet; Take 1 tablet (20 mg total) by mouth daily         Plan:    Start omeprazole 20 mg about 20 minutes before lunch daily and take this for 30 days then discontinue. Make changes in diet as discussed below, with goal of losing 5 to 10 pounds. Call back if symptoms become worse or do not resolve in the next week or 2. Otherwise, Macy Miller will call back in 6 weeks regarding his response to treatment. Chief Complaint     Chief Complaint   Patient presents with   • Heartburn     Started July, some vomiting, happens when eating chocolate. History of Present Illness   Rhonda Kaba is a 35y.o.-year-old male who made an appointment after being urged to do so by his girlfriend because of recent onset of heartburn. Macy Miller has a 1 month history of a nearly daily a sending burning in the substernal region causing a burning feeling in his throat, clearing of his throat and occasional cough. His symptoms are intermittent, and when they do occur they occur right after meals. He also symptoms that wake him up at night including a sending burning, feeling burning in his throat and coughing. He has no shortness of breath or wheezing. He had lesser symptoms earlier this summer and reports that he never had symptoms before the beginning of the summer. On further history, Macy Miller has gained about 10 pounds this year. He drinks 2 to 3 cups of coffee a day. He drinks alcoholic beverages 2-3 times a week. He does not drink to excess. He does not smoke. There is no intake of peppermint or spearmint. Chocolate intake is minimal.    He does not take NSAIDs more than rarely.     There is no sticking of food when he swallows and no painful swallowing. Regarding his diet, he has been eating a lot of takeout food that is high in calories. We discussion about his diagnosis of new onset GERD. We discussed about diet and I recommended the Mediterranean diet, eliminating takeout food, avoiding high fat dairy products, fried foods, red meat and high amounts of carbohydrates. I recommended trying to lose 5 to 10 pounds over the next 4 to 6 weeks. We will start omeprazole 20 mg daily prior to lunch. He generally does not eat breakfast.  We discussed that this medicine is activated by eating so he needs to eat before taking this. We discussed expecting properly for symptoms, and then observing after 30 days of treatment. We discussed that there may be a brief period of hyperacidity and symptoms and then resolution hopefully thereafter. Based on this, the check-in point will be about 6 weeks from now. We discussed the need for maintenance therapy of GERD continues, including initial trial of Pepcid. Jose Luis Esposito agrees to this plan. Review of Systems   Review of Systems   Respiratory:        No shortness of breath or wheezing. Cardiovascular:        Classic extending heartburn, no exertional pain. Gastrointestinal:        No history of previous gastrointestinal illness.        Active Problem List     Patient Active Problem List   Diagnosis   • Gastroesophageal reflux disease with esophagitis without hemorrhage       The following portions of the patient's history were reviewed and updated as appropriate: allergies, current medications, past family history, past medical history, past social history, past surgical history, and problem list.    Objective     Vitals:    08/07/23 0749   BP: 110/76   Pulse: 71   Temp: (!) 97 °F (36.1 °C)   SpO2: 96%     Wt Readings from Last 3 Encounters:   08/07/23 98 kg (216 lb)   09/09/22 90.7 kg (200 lb)   11/23/20 83.5 kg (184 lb)       Physical Exam     Vital signs stable, alert and oriented no distress. Normocephalic atraumatic. Voice is not hoarse. Oral cavity and throat appear normal.  There is no head or neck adenopathy. Trachea midline without stridor. Cardiac exam is normal auscultation lungs are clear. Abdomen: Nondistended with normal bowel sounds, nontender and soft without masses organomegaly. There are no surgical scars. There is no hernia. ALLERGIES:  No Known Allergies    Current Medications     Current Outpatient Medications   Medication Sig Dispense Refill   • omeprazole (PriLOSEC OTC) 20 MG tablet Take 1 tablet (20 mg total) by mouth daily 30 tablet 0     No current facility-administered medications for this visit.          Gely Eller MD

## 2023-09-04 DIAGNOSIS — K21.00 GASTROESOPHAGEAL REFLUX DISEASE WITH ESOPHAGITIS WITHOUT HEMORRHAGE: ICD-10-CM

## 2023-09-04 RX ORDER — NICOTINE POLACRILEX 4 MG/1
1 GUM, CHEWING ORAL DAILY
Qty: 28 TABLET | Refills: 1 | Status: SHIPPED | OUTPATIENT
Start: 2023-09-04